# Patient Record
Sex: FEMALE | Race: BLACK OR AFRICAN AMERICAN | URBAN - METROPOLITAN AREA
[De-identification: names, ages, dates, MRNs, and addresses within clinical notes are randomized per-mention and may not be internally consistent; named-entity substitution may affect disease eponyms.]

---

## 2021-01-19 ENCOUNTER — OFFICE VISIT (OUTPATIENT)
Dept: URBAN - METROPOLITAN AREA CLINIC 25 | Facility: CLINIC | Age: 36
End: 2021-01-19

## 2021-02-04 ENCOUNTER — OFFICE VISIT (OUTPATIENT)
Dept: URBAN - METROPOLITAN AREA TELEHEALTH 2 | Facility: TELEHEALTH | Age: 36
End: 2021-02-04

## 2021-02-04 RX ORDER — PREDNISONE 10 MG/1
TABLET ORAL
Qty: 0 | Refills: 0 | Status: ACTIVE | COMMUNITY
Start: 1900-01-01 | End: 1900-01-01

## 2021-02-04 RX ORDER — GABAPENTIN 600 MG/1
TAKE 1 TABLET (600 MG) BY ORAL ROUTE 3 TIMES PER DAY TABLET, FILM COATED ORAL
Qty: 0 | Refills: 0 | Status: ACTIVE | COMMUNITY
Start: 1900-01-01 | End: 1900-01-01

## 2021-02-04 RX ORDER — OXYCODONE HYDROCHLORIDE AND ACETAMINOPHEN 10; 325 MG/1; MG/1
TABLET ORAL
Qty: 0 | Refills: 0 | Status: ACTIVE | COMMUNITY
Start: 1900-01-01 | End: 1900-01-01

## 2021-03-04 ENCOUNTER — OFFICE VISIT (OUTPATIENT)
Dept: URBAN - METROPOLITAN AREA TELEHEALTH 2 | Facility: TELEHEALTH | Age: 36
End: 2021-03-04

## 2021-03-17 ENCOUNTER — OFFICE VISIT (OUTPATIENT)
Dept: URBAN - METROPOLITAN AREA TELEHEALTH 2 | Facility: TELEHEALTH | Age: 36
End: 2021-03-17

## 2021-03-17 RX ORDER — PREDNISONE 10 MG/1
TABLET ORAL
Qty: 0 | Refills: 0 | Status: ACTIVE | COMMUNITY
Start: 1900-01-01

## 2021-03-17 RX ORDER — NIFEDIPINE 60 MG/1
TABLET, EXTENDED RELEASE ORAL
Qty: 30 UNSPECIFIED | Status: ACTIVE | COMMUNITY

## 2021-03-17 RX ORDER — HYDROXYCHLOROQUINE SULFATE 200 MG/1
TABLET, FILM COATED ORAL
Qty: 60 UNSPECIFIED | Status: ACTIVE | COMMUNITY

## 2021-03-17 RX ORDER — OXYCODONE HYDROCHLORIDE AND ACETAMINOPHEN 10; 325 MG/1; MG/1
TABLET ORAL
Qty: 0 | Refills: 0 | Status: ACTIVE | COMMUNITY
Start: 1900-01-01

## 2021-03-17 RX ORDER — NYSTATIN 100000 [USP'U]/G
OINTMENT TOPICAL
Qty: 15 UNSPECIFIED | Status: ACTIVE | COMMUNITY

## 2021-03-17 RX ORDER — GABAPENTIN 600 MG/1
TAKE 1 TABLET (600 MG) BY ORAL ROUTE 3 TIMES PER DAY TABLET, FILM COATED ORAL
Qty: 0 | Refills: 0 | Status: ACTIVE | COMMUNITY
Start: 1900-01-01

## 2021-03-17 RX ORDER — OXYCODONE AND ACETAMINOPHEN 10; 325 MG/1; MG/1
TABLET ORAL
Qty: 100 UNSPECIFIED | Status: ACTIVE | COMMUNITY

## 2021-03-17 RX ORDER — DOXYCYCLINE HYCLATE 100 MG/1
CAPSULE ORAL
Qty: 28 UNSPECIFIED | Status: ACTIVE | COMMUNITY

## 2021-07-22 ENCOUNTER — TELEPHONE ENCOUNTER (OUTPATIENT)
Dept: URBAN - METROPOLITAN AREA CLINIC 25 | Facility: CLINIC | Age: 36
End: 2021-07-22

## 2021-07-26 ENCOUNTER — OFFICE VISIT (OUTPATIENT)
Dept: URBAN - METROPOLITAN AREA CLINIC 84 | Facility: CLINIC | Age: 36
End: 2021-07-26

## 2021-07-26 RX ORDER — NYSTATIN 100000 [USP'U]/G
OINTMENT TOPICAL
Qty: 15 UNSPECIFIED | Status: ACTIVE | COMMUNITY

## 2021-07-26 RX ORDER — PREDNISONE 10 MG/1
TABLET ORAL
Qty: 0 | Refills: 0 | Status: ACTIVE | COMMUNITY
Start: 1900-01-01

## 2021-07-26 RX ORDER — OXYCODONE AND ACETAMINOPHEN 10; 325 MG/1; MG/1
TABLET ORAL
Qty: 100 UNSPECIFIED | Status: ACTIVE | COMMUNITY

## 2021-07-26 RX ORDER — HYDROXYCHLOROQUINE SULFATE 200 MG/1
TABLET, FILM COATED ORAL
Qty: 60 UNSPECIFIED | Status: ACTIVE | COMMUNITY

## 2021-07-26 RX ORDER — NIFEDIPINE 60 MG/1
TABLET, EXTENDED RELEASE ORAL
Qty: 30 UNSPECIFIED | Status: ACTIVE | COMMUNITY

## 2021-07-26 RX ORDER — DOXYCYCLINE HYCLATE 100 MG/1
CAPSULE ORAL
Qty: 28 UNSPECIFIED | Status: ACTIVE | COMMUNITY

## 2021-07-26 RX ORDER — GABAPENTIN 600 MG/1
TAKE 1 TABLET (600 MG) BY ORAL ROUTE 3 TIMES PER DAY TABLET, FILM COATED ORAL
Qty: 0 | Refills: 0 | Status: ACTIVE | COMMUNITY
Start: 1900-01-01

## 2021-07-26 RX ORDER — OXYCODONE HYDROCHLORIDE AND ACETAMINOPHEN 10; 325 MG/1; MG/1
TABLET ORAL
Qty: 0 | Refills: 0 | Status: ACTIVE | COMMUNITY
Start: 1900-01-01

## 2021-07-27 ENCOUNTER — TELEPHONE ENCOUNTER (OUTPATIENT)
Dept: URBAN - METROPOLITAN AREA SURGERY CENTER 30 | Facility: SURGERY CENTER | Age: 36
End: 2021-07-27

## 2021-07-29 ENCOUNTER — TELEPHONE ENCOUNTER (OUTPATIENT)
Dept: URBAN - METROPOLITAN AREA SURGERY CENTER 30 | Facility: SURGERY CENTER | Age: 36
End: 2021-07-29

## 2021-07-29 ENCOUNTER — WEB ENCOUNTER (OUTPATIENT)
Dept: URBAN - METROPOLITAN AREA CLINIC 25 | Facility: CLINIC | Age: 36
End: 2021-07-29

## 2021-07-29 ENCOUNTER — OFFICE VISIT (OUTPATIENT)
Dept: URBAN - METROPOLITAN AREA CLINIC 25 | Facility: CLINIC | Age: 36
End: 2021-07-29
Payer: COMMERCIAL

## 2021-07-29 ENCOUNTER — DASHBOARD ENCOUNTERS (OUTPATIENT)
Age: 36
End: 2021-07-29

## 2021-07-29 VITALS
WEIGHT: 240 LBS | TEMPERATURE: 97.1 F | SYSTOLIC BLOOD PRESSURE: 109 MMHG | HEIGHT: 61 IN | BODY MASS INDEX: 45.31 KG/M2 | DIASTOLIC BLOOD PRESSURE: 79 MMHG | HEART RATE: 123 BPM

## 2021-07-29 DIAGNOSIS — R10.84 GENERALIZED ABDOMINAL PAIN: ICD-10-CM

## 2021-07-29 DIAGNOSIS — R63.4 WEIGHT LOSS: ICD-10-CM

## 2021-07-29 DIAGNOSIS — R11.14 BILIOUS VOMITING WITH NAUSEA: ICD-10-CM

## 2021-07-29 DIAGNOSIS — R11.0 NAUSEA: ICD-10-CM

## 2021-07-29 PROCEDURE — 99204 OFFICE O/P NEW MOD 45 MIN: CPT | Performed by: INTERNAL MEDICINE

## 2021-07-29 RX ORDER — HYDROXYCHLOROQUINE SULFATE 200 MG/1
TABLET, FILM COATED ORAL
Qty: 60 UNSPECIFIED | Status: ACTIVE | COMMUNITY

## 2021-07-29 RX ORDER — OMEPRAZOLE 40 MG/1
1 CAPSULE 30 MINUTES BEFORE MORNING MEAL CAPSULE, DELAYED RELEASE ORAL ONCE A DAY
Qty: 30 | Refills: 3 | OUTPATIENT
Start: 2021-07-29

## 2021-07-29 RX ORDER — OXYCODONE AND ACETAMINOPHEN 10; 325 MG/1; MG/1
TABLET ORAL
Qty: 100 UNSPECIFIED | Status: ACTIVE | COMMUNITY

## 2021-07-29 RX ORDER — DOXYCYCLINE HYCLATE 100 MG/1
CAPSULE ORAL
Qty: 28 UNSPECIFIED | Status: ACTIVE | COMMUNITY

## 2021-07-29 RX ORDER — POLYETHYLENE GLYCOL 3350 17 G/17G
AS DIRECTED POWDER, FOR SOLUTION ORAL ONCE A DAY
Qty: 238 GRAM | Refills: 0 | OUTPATIENT
Start: 2021-07-29 | End: 2021-07-30

## 2021-07-29 RX ORDER — CYPROHEPTADINE HYDROCHLORIDE 4 MG/1
1 TABLET TABLET ORAL ONCE A DAY
Qty: 30 TABLET | Refills: 2 | OUTPATIENT
Start: 2021-07-29

## 2021-07-29 RX ORDER — NYSTATIN 100000 [USP'U]/G
OINTMENT TOPICAL
Qty: 15 UNSPECIFIED | Status: DISCONTINUED | COMMUNITY

## 2021-07-29 RX ORDER — GABAPENTIN 600 MG/1
TAKE 1 TABLET (600 MG) BY ORAL ROUTE 3 TIMES PER DAY TABLET, FILM COATED ORAL
Qty: 0 | Refills: 0 | Status: ACTIVE | COMMUNITY
Start: 1900-01-01

## 2021-07-29 RX ORDER — OXYCODONE HYDROCHLORIDE AND ACETAMINOPHEN 10; 325 MG/1; MG/1
TABLET ORAL
Qty: 0 | Refills: 0 | Status: ACTIVE | COMMUNITY
Start: 1900-01-01

## 2021-07-29 RX ORDER — PREDNISONE 10 MG/1
TABLET ORAL
Qty: 0 | Refills: 0 | Status: ACTIVE | COMMUNITY
Start: 1900-01-01

## 2021-07-29 RX ORDER — ONDANSETRON HYDROCHLORIDE 4 MG/1
1 TABLET TABLET, FILM COATED ORAL ONCE A DAY
Qty: 10 | Refills: 1 | OUTPATIENT
Start: 2021-07-29

## 2021-07-29 RX ORDER — NIFEDIPINE 60 MG/1
TABLET, EXTENDED RELEASE ORAL
Qty: 30 UNSPECIFIED | Status: ACTIVE | COMMUNITY

## 2021-07-29 NOTE — HPI-TODAY'S VISIT:
37 yo pt presents with c/o 40 lb weight loss due to n/v and can only tolerate popsicles with associated rlq abd pain with blood in stool and sore throat. Sxs began in June.

## 2021-08-11 ENCOUNTER — OFFICE VISIT (OUTPATIENT)
Dept: URBAN - METROPOLITAN AREA TELEHEALTH 2 | Facility: TELEHEALTH | Age: 36
End: 2021-08-11

## 2021-08-19 ENCOUNTER — OFFICE VISIT (OUTPATIENT)
Dept: URBAN - METROPOLITAN AREA CLINIC 25 | Facility: CLINIC | Age: 36
End: 2021-08-19

## 2021-08-20 ENCOUNTER — OFFICE VISIT (OUTPATIENT)
Dept: URBAN - METROPOLITAN AREA SURGERY CENTER 20 | Facility: SURGERY CENTER | Age: 36
End: 2021-08-20

## 2021-08-20 PROBLEM — 102614006: Status: ACTIVE | Noted: 2021-07-29

## 2021-08-25 ENCOUNTER — OFFICE VISIT (OUTPATIENT)
Dept: URBAN - METROPOLITAN AREA SURGERY CENTER 20 | Facility: SURGERY CENTER | Age: 36
End: 2021-08-25

## 2021-08-26 ENCOUNTER — TELEPHONE ENCOUNTER (OUTPATIENT)
Dept: URBAN - METROPOLITAN AREA CLINIC 84 | Facility: CLINIC | Age: 36
End: 2021-08-26

## 2021-08-30 ENCOUNTER — TELEPHONE ENCOUNTER (OUTPATIENT)
Dept: URBAN - METROPOLITAN AREA CLINIC 92 | Facility: CLINIC | Age: 36
End: 2021-08-30

## 2021-08-30 ENCOUNTER — OFFICE VISIT (OUTPATIENT)
Dept: URBAN - METROPOLITAN AREA MEDICAL CENTER 17 | Facility: MEDICAL CENTER | Age: 36
End: 2021-08-30
Payer: COMMERCIAL

## 2021-08-30 DIAGNOSIS — R10.84 ABDOMINAL CRAMPING, GENERALIZED: ICD-10-CM

## 2021-08-30 PROBLEM — 89362005: Status: ACTIVE | Noted: 2021-07-29

## 2021-08-30 PROBLEM — 71419002: Status: ACTIVE | Noted: 2021-07-29

## 2021-08-30 PROCEDURE — 45378 DIAGNOSTIC COLONOSCOPY: CPT | Performed by: INTERNAL MEDICINE

## 2021-08-30 RX ORDER — OMEPRAZOLE 40 MG/1
1 CAPSULE 30 MINUTES BEFORE MORNING MEAL CAPSULE, DELAYED RELEASE ORAL ONCE A DAY
Qty: 30 | Refills: 3 | Status: ACTIVE | COMMUNITY
Start: 2021-07-29

## 2021-08-30 RX ORDER — PREDNISONE 10 MG/1
TABLET ORAL
Qty: 0 | Refills: 0 | Status: ACTIVE | COMMUNITY
Start: 1900-01-01

## 2021-08-30 RX ORDER — OXYCODONE AND ACETAMINOPHEN 10; 325 MG/1; MG/1
TABLET ORAL
Qty: 100 UNSPECIFIED | Status: ACTIVE | COMMUNITY

## 2021-08-30 RX ORDER — CYPROHEPTADINE HYDROCHLORIDE 4 MG/1
1 TABLET TABLET ORAL ONCE A DAY
Qty: 30 TABLET | Refills: 2 | Status: ACTIVE | COMMUNITY
Start: 2021-07-29

## 2021-08-30 RX ORDER — GABAPENTIN 600 MG/1
TAKE 1 TABLET (600 MG) BY ORAL ROUTE 3 TIMES PER DAY TABLET, FILM COATED ORAL
Qty: 0 | Refills: 0 | Status: ACTIVE | COMMUNITY
Start: 1900-01-01

## 2021-08-30 RX ORDER — HYDROXYCHLOROQUINE SULFATE 200 MG/1
TABLET, FILM COATED ORAL
Qty: 60 UNSPECIFIED | Status: ACTIVE | COMMUNITY

## 2021-08-30 RX ORDER — ONDANSETRON HYDROCHLORIDE 4 MG/1
1 TABLET TABLET, FILM COATED ORAL ONCE A DAY
Qty: 10 | Refills: 1 | Status: ACTIVE | COMMUNITY
Start: 2021-07-29

## 2021-08-30 RX ORDER — NIFEDIPINE 60 MG/1
TABLET, EXTENDED RELEASE ORAL
Qty: 30 UNSPECIFIED | Status: ACTIVE | COMMUNITY

## 2021-08-30 RX ORDER — DOXYCYCLINE HYCLATE 100 MG/1
CAPSULE ORAL
Qty: 28 UNSPECIFIED | Status: ACTIVE | COMMUNITY

## 2021-08-30 RX ORDER — OXYCODONE HYDROCHLORIDE AND ACETAMINOPHEN 10; 325 MG/1; MG/1
TABLET ORAL
Qty: 0 | Refills: 0 | Status: ACTIVE | COMMUNITY
Start: 1900-01-01

## 2021-10-14 ENCOUNTER — OFFICE VISIT (OUTPATIENT)
Dept: URBAN - METROPOLITAN AREA TELEHEALTH 2 | Facility: TELEHEALTH | Age: 36
End: 2021-10-14

## 2021-10-14 RX ORDER — HYDROXYCHLOROQUINE SULFATE 200 MG/1
TABLET, FILM COATED ORAL
Qty: 60 UNSPECIFIED | Status: ACTIVE | COMMUNITY

## 2021-10-14 RX ORDER — DOXYCYCLINE HYCLATE 100 MG/1
CAPSULE ORAL
Qty: 28 UNSPECIFIED | Status: ACTIVE | COMMUNITY

## 2021-10-14 RX ORDER — GABAPENTIN 600 MG/1
TAKE 1 TABLET (600 MG) BY ORAL ROUTE 3 TIMES PER DAY TABLET, FILM COATED ORAL
Qty: 0 | Refills: 0 | Status: ACTIVE | COMMUNITY
Start: 1900-01-01

## 2021-10-14 RX ORDER — PREDNISONE 10 MG/1
TABLET ORAL
Qty: 0 | Refills: 0 | Status: ACTIVE | COMMUNITY
Start: 1900-01-01

## 2021-10-14 RX ORDER — ONDANSETRON HYDROCHLORIDE 4 MG/1
1 TABLET TABLET, FILM COATED ORAL ONCE A DAY
Qty: 10 | Refills: 1 | Status: ACTIVE | COMMUNITY
Start: 2021-07-29

## 2021-10-14 RX ORDER — OXYCODONE AND ACETAMINOPHEN 10; 325 MG/1; MG/1
TABLET ORAL
Qty: 100 UNSPECIFIED | Status: ACTIVE | COMMUNITY

## 2021-10-14 RX ORDER — CYPROHEPTADINE HYDROCHLORIDE 4 MG/1
1 TABLET TABLET ORAL ONCE A DAY
Qty: 30 TABLET | Refills: 2 | Status: ACTIVE | COMMUNITY
Start: 2021-07-29

## 2021-10-14 RX ORDER — NIFEDIPINE 60 MG/1
TABLET, EXTENDED RELEASE ORAL
Qty: 30 UNSPECIFIED | Status: ACTIVE | COMMUNITY

## 2021-10-14 RX ORDER — OXYCODONE HYDROCHLORIDE AND ACETAMINOPHEN 10; 325 MG/1; MG/1
TABLET ORAL
Qty: 0 | Refills: 0 | Status: ACTIVE | COMMUNITY
Start: 1900-01-01

## 2021-10-14 RX ORDER — OMEPRAZOLE 40 MG/1
1 CAPSULE 30 MINUTES BEFORE MORNING MEAL CAPSULE, DELAYED RELEASE ORAL ONCE A DAY
Qty: 30 | Refills: 3 | Status: ACTIVE | COMMUNITY
Start: 2021-07-29

## 2021-12-15 ENCOUNTER — P2P PATIENT RECORD (OUTPATIENT)
Age: 36
End: 2021-12-15

## 2021-12-16 ENCOUNTER — OUT OF OFFICE VISIT (OUTPATIENT)
Dept: URBAN - METROPOLITAN AREA MEDICAL CENTER 8 | Facility: MEDICAL CENTER | Age: 36
End: 2021-12-16
Payer: COMMERCIAL

## 2021-12-16 ENCOUNTER — P2P PATIENT RECORD (OUTPATIENT)
Age: 36
End: 2021-12-16

## 2021-12-16 DIAGNOSIS — R93.3 ABN FINDINGS-GI TRACT: ICD-10-CM

## 2021-12-16 DIAGNOSIS — R11.0 AM NAUSEA: ICD-10-CM

## 2021-12-16 DIAGNOSIS — R10.84 ABDOMINAL CRAMPING, GENERALIZED: ICD-10-CM

## 2021-12-16 DIAGNOSIS — R19.7 ACUTE DIARRHEA: ICD-10-CM

## 2021-12-16 DIAGNOSIS — D64.89 ANEMIA DUE TO OTHER CAUSE: ICD-10-CM

## 2021-12-16 PROCEDURE — 99232 SBSQ HOSP IP/OBS MODERATE 35: CPT | Performed by: PHYSICIAN ASSISTANT

## 2021-12-16 PROCEDURE — 99222 1ST HOSP IP/OBS MODERATE 55: CPT | Performed by: PHYSICIAN ASSISTANT

## 2021-12-16 PROCEDURE — G8427 DOCREV CUR MEDS BY ELIG CLIN: HCPCS | Performed by: PHYSICIAN ASSISTANT

## 2021-12-24 ENCOUNTER — P2P PATIENT RECORD (OUTPATIENT)
Age: 36
End: 2021-12-24

## 2022-03-18 ENCOUNTER — OUT OF OFFICE VISIT (OUTPATIENT)
Dept: URBAN - METROPOLITAN AREA MEDICAL CENTER 31 | Facility: MEDICAL CENTER | Age: 37
End: 2022-03-18
Payer: COMMERCIAL

## 2022-03-18 DIAGNOSIS — Z98.84 BARIATRIC SURG STAT-UNSP: ICD-10-CM

## 2022-03-18 DIAGNOSIS — R13.19 CERVICAL DYSPHAGIA: ICD-10-CM

## 2022-03-18 DIAGNOSIS — R10.84 ABDOMINAL CRAMPING, GENERALIZED: ICD-10-CM

## 2022-03-18 DIAGNOSIS — R11.2 ACUTE NAUSEA WITH NONBILIOUS VOMITING: ICD-10-CM

## 2022-03-18 PROCEDURE — 99232 SBSQ HOSP IP/OBS MODERATE 35: CPT | Performed by: STUDENT IN AN ORGANIZED HEALTH CARE EDUCATION/TRAINING PROGRAM

## 2022-03-19 ENCOUNTER — OUT OF OFFICE VISIT (OUTPATIENT)
Dept: URBAN - METROPOLITAN AREA MEDICAL CENTER 31 | Facility: MEDICAL CENTER | Age: 37
End: 2022-03-19
Payer: COMMERCIAL

## 2022-03-19 DIAGNOSIS — K22.2 ACQUIRED ESOPHAGEAL RING: ICD-10-CM

## 2022-03-19 DIAGNOSIS — R10.13 ABDOMINAL DISCOMFORT, EPIGASTRIC: ICD-10-CM

## 2022-03-19 DIAGNOSIS — K31.89 ACQUIRED DEFORMITY OF DUODENUM: ICD-10-CM

## 2022-03-19 DIAGNOSIS — K22.89 ESOPHAGEAL BLEED, NON-VARICEAL: ICD-10-CM

## 2022-03-19 DIAGNOSIS — R13.19 CERVICAL DYSPHAGIA: ICD-10-CM

## 2022-03-19 DIAGNOSIS — K29.60 ADENOPAPILLOMATOSIS GASTRICA: ICD-10-CM

## 2022-03-19 PROCEDURE — 43248 EGD GUIDE WIRE INSERTION: CPT | Performed by: INTERNAL MEDICINE

## 2022-03-19 PROCEDURE — 43239 EGD BIOPSY SINGLE/MULTIPLE: CPT | Performed by: INTERNAL MEDICINE

## 2022-03-22 ENCOUNTER — OUT OF OFFICE VISIT (OUTPATIENT)
Dept: URBAN - METROPOLITAN AREA MEDICAL CENTER 31 | Facility: MEDICAL CENTER | Age: 37
End: 2022-03-22
Payer: COMMERCIAL

## 2022-03-22 DIAGNOSIS — R11.2 ACUTE NAUSEA WITH NONBILIOUS VOMITING: ICD-10-CM

## 2022-03-22 DIAGNOSIS — Z98.84 BARIATRIC SURG STAT-UNSP: ICD-10-CM

## 2022-03-22 DIAGNOSIS — R10.84 ABDOMINAL CRAMPING, GENERALIZED: ICD-10-CM

## 2022-03-22 PROCEDURE — 99232 SBSQ HOSP IP/OBS MODERATE 35: CPT | Performed by: STUDENT IN AN ORGANIZED HEALTH CARE EDUCATION/TRAINING PROGRAM

## 2022-03-29 ENCOUNTER — OUT OF OFFICE VISIT (OUTPATIENT)
Dept: URBAN - METROPOLITAN AREA MEDICAL CENTER 31 | Facility: MEDICAL CENTER | Age: 37
End: 2022-03-29
Payer: COMMERCIAL

## 2022-03-29 DIAGNOSIS — R10.84 ABDOMINAL CRAMPING, GENERALIZED: ICD-10-CM

## 2022-03-29 DIAGNOSIS — R11.0 AM NAUSEA: ICD-10-CM

## 2022-03-29 PROCEDURE — 99232 SBSQ HOSP IP/OBS MODERATE 35: CPT | Performed by: STUDENT IN AN ORGANIZED HEALTH CARE EDUCATION/TRAINING PROGRAM

## 2022-04-18 ENCOUNTER — OUT OF OFFICE VISIT (OUTPATIENT)
Dept: URBAN - METROPOLITAN AREA MEDICAL CENTER 31 | Facility: MEDICAL CENTER | Age: 37
End: 2022-04-18
Payer: COMMERCIAL

## 2022-04-18 DIAGNOSIS — R11.2 ACUTE NAUSEA WITH NONBILIOUS VOMITING: ICD-10-CM

## 2022-04-18 DIAGNOSIS — R10.13 ABDOMINAL DISCOMFORT, EPIGASTRIC: ICD-10-CM

## 2022-04-18 DIAGNOSIS — Z98.84 BARIATRIC SURG STAT-UNSP: ICD-10-CM

## 2022-04-18 PROCEDURE — 99232 SBSQ HOSP IP/OBS MODERATE 35: CPT | Performed by: PHYSICIAN ASSISTANT

## 2022-04-18 PROCEDURE — 99222 1ST HOSP IP/OBS MODERATE 55: CPT | Performed by: PHYSICIAN ASSISTANT

## 2022-04-18 PROCEDURE — G8427 DOCREV CUR MEDS BY ELIG CLIN: HCPCS | Performed by: PHYSICIAN ASSISTANT

## 2022-04-30 ENCOUNTER — TELEPHONE ENCOUNTER (OUTPATIENT)
Dept: URBAN - METROPOLITAN AREA CLINIC 121 | Facility: CLINIC | Age: 37
End: 2022-04-30

## 2022-05-01 ENCOUNTER — TELEPHONE ENCOUNTER (OUTPATIENT)
Dept: URBAN - METROPOLITAN AREA CLINIC 121 | Facility: CLINIC | Age: 37
End: 2022-05-01

## 2022-06-23 ENCOUNTER — OUT OF OFFICE VISIT (OUTPATIENT)
Dept: URBAN - METROPOLITAN AREA MEDICAL CENTER 31 | Facility: MEDICAL CENTER | Age: 37
End: 2022-06-23
Payer: COMMERCIAL

## 2022-06-23 DIAGNOSIS — R10.84 ABDOMINAL CRAMPING, GENERALIZED: ICD-10-CM

## 2022-06-23 DIAGNOSIS — R11.2 ACUTE NAUSEA WITH NONBILIOUS VOMITING: ICD-10-CM

## 2022-06-23 PROCEDURE — G8427 DOCREV CUR MEDS BY ELIG CLIN: HCPCS | Performed by: PHYSICIAN ASSISTANT

## 2022-06-23 PROCEDURE — 99222 1ST HOSP IP/OBS MODERATE 55: CPT | Performed by: PHYSICIAN ASSISTANT

## 2022-06-24 ENCOUNTER — OUT OF OFFICE VISIT (OUTPATIENT)
Dept: URBAN - METROPOLITAN AREA MEDICAL CENTER 31 | Facility: MEDICAL CENTER | Age: 37
End: 2022-06-24
Payer: COMMERCIAL

## 2022-06-24 DIAGNOSIS — R11.2 ACUTE NAUSEA WITH NONBILIOUS VOMITING: ICD-10-CM

## 2022-06-24 DIAGNOSIS — R10.84 ABDOMINAL CRAMPING, GENERALIZED: ICD-10-CM

## 2022-06-24 PROCEDURE — 99232 SBSQ HOSP IP/OBS MODERATE 35: CPT | Performed by: PHYSICIAN ASSISTANT

## 2023-02-10 ENCOUNTER — CLAIMS CREATED FROM THE CLAIM WINDOW (OUTPATIENT)
Dept: URBAN - METROPOLITAN AREA MEDICAL CENTER 10 | Facility: MEDICAL CENTER | Age: 38
End: 2023-02-10

## 2023-02-10 ENCOUNTER — CLAIMS CREATED FROM THE CLAIM WINDOW (OUTPATIENT)
Dept: URBAN - METROPOLITAN AREA MEDICAL CENTER 10 | Facility: MEDICAL CENTER | Age: 38
End: 2023-02-10
Payer: COMMERCIAL

## 2023-02-10 DIAGNOSIS — R11.2 ACUTE NAUSEA WITH NONBILIOUS VOMITING: ICD-10-CM

## 2023-02-10 DIAGNOSIS — I82.412 ACUTE DEEP VEIN THROMBOSIS (DVT) OF FEMORAL VEIN OF LEFT LOWER EXTREMITY: ICD-10-CM

## 2023-02-10 DIAGNOSIS — R10.84 ABDOMINAL CRAMPING, GENERALIZED: ICD-10-CM

## 2023-02-10 PROCEDURE — G8427 DOCREV CUR MEDS BY ELIG CLIN: HCPCS | Performed by: PHYSICIAN ASSISTANT

## 2023-02-10 PROCEDURE — 99222 1ST HOSP IP/OBS MODERATE 55: CPT | Performed by: PHYSICIAN ASSISTANT

## 2023-02-13 ENCOUNTER — OUT OF OFFICE VISIT (OUTPATIENT)
Dept: URBAN - METROPOLITAN AREA MEDICAL CENTER 10 | Facility: MEDICAL CENTER | Age: 38
End: 2023-02-13
Payer: COMMERCIAL

## 2023-02-13 DIAGNOSIS — Z98.84 BARIATRIC SURG STAT-UNSP: ICD-10-CM

## 2023-02-13 DIAGNOSIS — R13.19 CERVICAL DYSPHAGIA: ICD-10-CM

## 2023-02-13 DIAGNOSIS — K22.2 ACQUIRED ESOPHAGEAL RING: ICD-10-CM

## 2023-02-13 PROCEDURE — 43249 ESOPH EGD DILATION <30 MM: CPT | Performed by: INTERNAL MEDICINE

## 2023-02-15 ENCOUNTER — CLAIMS CREATED FROM THE CLAIM WINDOW (OUTPATIENT)
Dept: URBAN - METROPOLITAN AREA MEDICAL CENTER 10 | Facility: MEDICAL CENTER | Age: 38
End: 2023-02-15

## 2023-02-15 ENCOUNTER — CLAIMS CREATED FROM THE CLAIM WINDOW (OUTPATIENT)
Dept: URBAN - METROPOLITAN AREA MEDICAL CENTER 10 | Facility: MEDICAL CENTER | Age: 38
End: 2023-02-15
Payer: COMMERCIAL

## 2023-02-15 DIAGNOSIS — R11.2 ACUTE NAUSEA WITH NONBILIOUS VOMITING: ICD-10-CM

## 2023-02-15 DIAGNOSIS — R10.84 ABDOMINAL CRAMPING, GENERALIZED: ICD-10-CM

## 2023-02-15 DIAGNOSIS — K25.9 ANTRAL ULCER: ICD-10-CM

## 2023-02-15 PROCEDURE — 99232 SBSQ HOSP IP/OBS MODERATE 35: CPT | Performed by: PHYSICIAN ASSISTANT

## 2023-02-18 ENCOUNTER — CLAIMS CREATED FROM THE CLAIM WINDOW (OUTPATIENT)
Dept: URBAN - METROPOLITAN AREA MEDICAL CENTER 10 | Facility: MEDICAL CENTER | Age: 38
End: 2023-02-18
Payer: COMMERCIAL

## 2023-02-18 ENCOUNTER — OUT OF OFFICE VISIT (OUTPATIENT)
Dept: URBAN - METROPOLITAN AREA MEDICAL CENTER 10 | Facility: MEDICAL CENTER | Age: 38
End: 2023-02-18

## 2023-02-18 DIAGNOSIS — R10.84 ABDOMINAL CRAMPING, GENERALIZED: ICD-10-CM

## 2023-02-18 DIAGNOSIS — K22.2 ACQUIRED ESOPHAGEAL RING: ICD-10-CM

## 2023-02-18 DIAGNOSIS — R13.19 CERVICAL DYSPHAGIA: ICD-10-CM

## 2023-02-18 DIAGNOSIS — R11.2 ACUTE NAUSEA WITH NONBILIOUS VOMITING: ICD-10-CM

## 2023-02-18 DIAGNOSIS — R11.0 AM NAUSEA: ICD-10-CM

## 2023-02-18 PROCEDURE — 99232 SBSQ HOSP IP/OBS MODERATE 35: CPT | Performed by: INTERNAL MEDICINE

## 2023-02-18 PROCEDURE — 99232 SBSQ HOSP IP/OBS MODERATE 35: CPT | Performed by: PHYSICIAN ASSISTANT

## 2023-02-20 ENCOUNTER — OUT OF OFFICE VISIT (OUTPATIENT)
Dept: URBAN - METROPOLITAN AREA MEDICAL CENTER 10 | Facility: MEDICAL CENTER | Age: 38
End: 2023-02-20

## 2023-02-20 ENCOUNTER — CLAIMS CREATED FROM THE CLAIM WINDOW (OUTPATIENT)
Dept: URBAN - METROPOLITAN AREA MEDICAL CENTER 10 | Facility: MEDICAL CENTER | Age: 38
End: 2023-02-20
Payer: COMMERCIAL

## 2023-02-20 DIAGNOSIS — R11.10 ACUTE VOMITING: ICD-10-CM

## 2023-02-20 DIAGNOSIS — R10.84 ABDOMINAL CRAMPING, GENERALIZED: ICD-10-CM

## 2023-02-20 DIAGNOSIS — K59.09 CHANGE IN BOWEL MOVEMENTS INTERMITTENT CONSTIPATION. URGENCY IN THE MORNING.: ICD-10-CM

## 2023-02-20 DIAGNOSIS — R13.19 CERVICAL DYSPHAGIA: ICD-10-CM

## 2023-02-20 PROCEDURE — 99232 SBSQ HOSP IP/OBS MODERATE 35: CPT | Performed by: PHYSICIAN ASSISTANT

## 2023-02-22 ENCOUNTER — OUT OF OFFICE VISIT (OUTPATIENT)
Dept: URBAN - METROPOLITAN AREA MEDICAL CENTER 10 | Facility: MEDICAL CENTER | Age: 38
End: 2023-02-22

## 2023-02-22 ENCOUNTER — CLAIMS CREATED FROM THE CLAIM WINDOW (OUTPATIENT)
Dept: URBAN - METROPOLITAN AREA MEDICAL CENTER 10 | Facility: MEDICAL CENTER | Age: 38
End: 2023-02-22
Payer: COMMERCIAL

## 2023-02-22 DIAGNOSIS — R13.19 CERVICAL DYSPHAGIA: ICD-10-CM

## 2023-02-22 DIAGNOSIS — R11.0 AM NAUSEA: ICD-10-CM

## 2023-02-22 DIAGNOSIS — R10.84 ABDOMINAL CRAMPING, GENERALIZED: ICD-10-CM

## 2023-02-22 PROCEDURE — 99231 SBSQ HOSP IP/OBS SF/LOW 25: CPT | Performed by: PHYSICIAN ASSISTANT

## 2023-02-23 ENCOUNTER — OUT OF OFFICE VISIT (OUTPATIENT)
Dept: URBAN - METROPOLITAN AREA MEDICAL CENTER 10 | Facility: MEDICAL CENTER | Age: 38
End: 2023-02-23

## 2023-02-23 ENCOUNTER — CLAIMS CREATED FROM THE CLAIM WINDOW (OUTPATIENT)
Dept: URBAN - METROPOLITAN AREA MEDICAL CENTER 10 | Facility: MEDICAL CENTER | Age: 38
End: 2023-02-23
Payer: COMMERCIAL

## 2023-02-23 DIAGNOSIS — R13.19 CERVICAL DYSPHAGIA: ICD-10-CM

## 2023-02-23 DIAGNOSIS — R10.84 ABDOMINAL CRAMPING, GENERALIZED: ICD-10-CM

## 2023-02-23 DIAGNOSIS — R11.2 ACUTE NAUSEA WITH NONBILIOUS VOMITING: ICD-10-CM

## 2023-02-23 PROCEDURE — 99231 SBSQ HOSP IP/OBS SF/LOW 25: CPT | Performed by: PHYSICIAN ASSISTANT

## 2024-01-31 ENCOUNTER — CLAIMS CREATED FROM THE CLAIM WINDOW (OUTPATIENT)
Dept: URBAN - METROPOLITAN AREA MEDICAL CENTER 10 | Facility: MEDICAL CENTER | Age: 39
End: 2024-01-31
Payer: COMMERCIAL

## 2024-01-31 DIAGNOSIS — R13.19 CERVICAL DYSPHAGIA: ICD-10-CM

## 2024-01-31 DIAGNOSIS — R93.3 ABN FINDINGS-GI TRACT: ICD-10-CM

## 2024-01-31 DIAGNOSIS — R11.2 ACUTE NAUSEA WITH NONBILIOUS VOMITING: ICD-10-CM

## 2024-01-31 PROCEDURE — G8427 DOCREV CUR MEDS BY ELIG CLIN: HCPCS | Performed by: PHYSICIAN ASSISTANT

## 2024-01-31 PROCEDURE — 99222 1ST HOSP IP/OBS MODERATE 55: CPT | Performed by: PHYSICIAN ASSISTANT

## 2024-01-31 PROCEDURE — 99255 IP/OBS CONSLTJ NEW/EST HI 80: CPT | Performed by: PHYSICIAN ASSISTANT

## 2024-02-01 ENCOUNTER — LAB (OUTPATIENT)
Dept: URBAN - METROPOLITAN AREA MEDICAL CENTER 10 | Facility: MEDICAL CENTER | Age: 39
End: 2024-02-01
Payer: COMMERCIAL

## 2024-02-01 DIAGNOSIS — K22.2 ACQUIRED ESOPHAGEAL RING: ICD-10-CM

## 2024-02-01 DIAGNOSIS — Z98.890 HISTORY OF NISSEN FUNDOPLICATION: ICD-10-CM

## 2024-02-01 PROCEDURE — 43213 ESOPHAGOSCOPY RETRO BALLOON: CPT | Performed by: INTERNAL MEDICINE

## 2024-02-01 PROCEDURE — 43235 EGD DIAGNOSTIC BRUSH WASH: CPT | Performed by: INTERNAL MEDICINE

## 2024-02-05 ENCOUNTER — LAB (OUTPATIENT)
Dept: URBAN - METROPOLITAN AREA MEDICAL CENTER 10 | Facility: MEDICAL CENTER | Age: 39
End: 2024-02-05
Payer: COMMERCIAL

## 2024-02-05 DIAGNOSIS — Z98.0 H/O BILLROTH II OPERATION: ICD-10-CM

## 2024-02-05 DIAGNOSIS — K22.2 ACQUIRED ESOPHAGEAL RING: ICD-10-CM

## 2024-02-05 DIAGNOSIS — R13.19 CERVICAL DYSPHAGIA: ICD-10-CM

## 2024-02-05 PROCEDURE — 43249 ESOPH EGD DILATION <30 MM: CPT | Performed by: INTERNAL MEDICINE

## 2024-02-05 PROCEDURE — 43235 EGD DIAGNOSTIC BRUSH WASH: CPT | Performed by: INTERNAL MEDICINE

## 2024-02-07 ENCOUNTER — LAB (OUTPATIENT)
Dept: URBAN - METROPOLITAN AREA MEDICAL CENTER 10 | Facility: MEDICAL CENTER | Age: 39
End: 2024-02-07
Payer: COMMERCIAL

## 2024-02-07 DIAGNOSIS — R10.33 ABDOMINAL PAIN, ACUTE, PERIUMBILICAL: ICD-10-CM

## 2024-02-07 DIAGNOSIS — R11.2 ACUTE NAUSEA WITH NONBILIOUS VOMITING: ICD-10-CM

## 2024-02-07 PROCEDURE — 99232 SBSQ HOSP IP/OBS MODERATE 35: CPT | Performed by: INTERNAL MEDICINE

## 2024-02-08 ENCOUNTER — LAB (OUTPATIENT)
Dept: URBAN - METROPOLITAN AREA MEDICAL CENTER 10 | Facility: MEDICAL CENTER | Age: 39
End: 2024-02-08
Payer: COMMERCIAL

## 2024-02-08 DIAGNOSIS — K22.2 ACQUIRED ESOPHAGEAL RING: ICD-10-CM

## 2024-02-08 DIAGNOSIS — R13.19 CERVICAL DYSPHAGIA: ICD-10-CM

## 2024-02-08 PROCEDURE — 43235 EGD DIAGNOSTIC BRUSH WASH: CPT | Performed by: INTERNAL MEDICINE

## 2024-02-08 PROCEDURE — 74360 X-RAY GUIDE GI DILATION: CPT | Performed by: INTERNAL MEDICINE

## 2024-02-08 PROCEDURE — 43212 ESOPHAGOSCOP STENT PLACEMENT: CPT | Performed by: INTERNAL MEDICINE

## 2024-02-10 ENCOUNTER — LAB (OUTPATIENT)
Dept: URBAN - METROPOLITAN AREA MEDICAL CENTER 10 | Facility: MEDICAL CENTER | Age: 39
End: 2024-02-10
Payer: COMMERCIAL

## 2024-02-10 ENCOUNTER — LAB (OUTPATIENT)
Dept: URBAN - METROPOLITAN AREA MEDICAL CENTER 10 | Facility: MEDICAL CENTER | Age: 39
End: 2024-02-10

## 2024-02-10 DIAGNOSIS — R11.2 ACUTE NAUSEA WITH NONBILIOUS VOMITING: ICD-10-CM

## 2024-02-10 DIAGNOSIS — R93.3 ABN FINDINGS-GI TRACT: ICD-10-CM

## 2024-02-10 DIAGNOSIS — R10.13 ABDOMINAL DISCOMFORT, EPIGASTRIC: ICD-10-CM

## 2024-02-10 DIAGNOSIS — R13.19 CERVICAL DYSPHAGIA: ICD-10-CM

## 2024-02-10 PROCEDURE — 99232 SBSQ HOSP IP/OBS MODERATE 35: CPT | Performed by: STUDENT IN AN ORGANIZED HEALTH CARE EDUCATION/TRAINING PROGRAM

## 2024-02-10 PROCEDURE — 99232 SBSQ HOSP IP/OBS MODERATE 35: CPT | Performed by: PHYSICIAN ASSISTANT

## 2024-02-11 ENCOUNTER — LAB (OUTPATIENT)
Dept: URBAN - METROPOLITAN AREA MEDICAL CENTER 10 | Facility: MEDICAL CENTER | Age: 39
End: 2024-02-11
Payer: COMMERCIAL

## 2024-02-11 ENCOUNTER — LAB (OUTPATIENT)
Dept: URBAN - METROPOLITAN AREA MEDICAL CENTER 10 | Facility: MEDICAL CENTER | Age: 39
End: 2024-02-11

## 2024-02-11 DIAGNOSIS — R11.2 ACUTE NAUSEA WITH NONBILIOUS VOMITING: ICD-10-CM

## 2024-02-11 DIAGNOSIS — R10.13 ABDOMINAL DISCOMFORT, EPIGASTRIC: ICD-10-CM

## 2024-02-11 DIAGNOSIS — R13.19 CERVICAL DYSPHAGIA: ICD-10-CM

## 2024-02-11 PROCEDURE — 99232 SBSQ HOSP IP/OBS MODERATE 35: CPT | Performed by: PHYSICIAN ASSISTANT

## 2024-02-11 PROCEDURE — 99232 SBSQ HOSP IP/OBS MODERATE 35: CPT | Performed by: STUDENT IN AN ORGANIZED HEALTH CARE EDUCATION/TRAINING PROGRAM

## 2024-02-12 ENCOUNTER — LAB (OUTPATIENT)
Dept: URBAN - METROPOLITAN AREA MEDICAL CENTER 10 | Facility: MEDICAL CENTER | Age: 39
End: 2024-02-12

## 2024-02-12 ENCOUNTER — LAB (OUTPATIENT)
Dept: URBAN - METROPOLITAN AREA MEDICAL CENTER 10 | Facility: MEDICAL CENTER | Age: 39
End: 2024-02-12
Payer: COMMERCIAL

## 2024-02-12 DIAGNOSIS — R13.19 CERVICAL DYSPHAGIA: ICD-10-CM

## 2024-02-12 DIAGNOSIS — R10.13 ABDOMINAL DISCOMFORT, EPIGASTRIC: ICD-10-CM

## 2024-02-12 DIAGNOSIS — R11.2 ACUTE NAUSEA WITH NONBILIOUS VOMITING: ICD-10-CM

## 2024-02-12 PROCEDURE — 99232 SBSQ HOSP IP/OBS MODERATE 35: CPT | Performed by: PHYSICIAN ASSISTANT

## 2024-02-12 PROCEDURE — 99232 SBSQ HOSP IP/OBS MODERATE 35: CPT | Performed by: INTERNAL MEDICINE

## 2024-02-13 ENCOUNTER — LAB (OUTPATIENT)
Dept: URBAN - METROPOLITAN AREA MEDICAL CENTER 10 | Facility: MEDICAL CENTER | Age: 39
End: 2024-02-13
Payer: COMMERCIAL

## 2024-02-13 DIAGNOSIS — T18.108A FOREIGN BODY IN ESOPHAGUS, INITIAL ENCOUNTER: ICD-10-CM

## 2024-02-13 PROCEDURE — 43247 EGD REMOVE FOREIGN BODY: CPT | Performed by: INTERNAL MEDICINE

## 2024-02-13 PROCEDURE — 43235 EGD DIAGNOSTIC BRUSH WASH: CPT | Performed by: INTERNAL MEDICINE

## 2025-02-19 ENCOUNTER — CLAIMS CREATED FROM THE CLAIM WINDOW (OUTPATIENT)
Dept: URBAN - METROPOLITAN AREA MEDICAL CENTER 28 | Facility: MEDICAL CENTER | Age: 40
End: 2025-02-19

## 2025-02-19 PROCEDURE — 99254 IP/OBS CNSLTJ NEW/EST MOD 60: CPT

## 2025-02-20 ENCOUNTER — CLAIMS CREATED FROM THE CLAIM WINDOW (OUTPATIENT)
Dept: URBAN - METROPOLITAN AREA MEDICAL CENTER 28 | Facility: MEDICAL CENTER | Age: 40
End: 2025-02-20
Payer: COMMERCIAL

## 2025-02-20 DIAGNOSIS — R11.2 ACUTE NAUSEA WITH NONBILIOUS VOMITING: ICD-10-CM

## 2025-02-20 DIAGNOSIS — R10.84 ABDOMINAL CRAMPING, GENERALIZED: ICD-10-CM

## 2025-02-20 PROCEDURE — 99232 SBSQ HOSP IP/OBS MODERATE 35: CPT

## 2025-02-21 ENCOUNTER — CLAIMS CREATED FROM THE CLAIM WINDOW (OUTPATIENT)
Dept: URBAN - METROPOLITAN AREA MEDICAL CENTER 28 | Facility: MEDICAL CENTER | Age: 40
End: 2025-02-21
Payer: COMMERCIAL

## 2025-02-21 DIAGNOSIS — R10.84 ABDOMINAL CRAMPING, GENERALIZED: ICD-10-CM

## 2025-02-21 DIAGNOSIS — K29.60 ADENOPAPILLOMATOSIS GASTRICA: ICD-10-CM

## 2025-02-21 DIAGNOSIS — R11.2 ACUTE NAUSEA WITH NONBILIOUS VOMITING: ICD-10-CM

## 2025-02-21 DIAGNOSIS — K22.89 COLUMNAR EPITHELIAL-LINED LOWER ESOPHAGUS: ICD-10-CM

## 2025-02-21 PROCEDURE — 43235 EGD DIAGNOSTIC BRUSH WASH: CPT | Performed by: INTERNAL MEDICINE

## 2025-02-21 PROCEDURE — 43239 EGD BIOPSY SINGLE/MULTIPLE: CPT | Performed by: INTERNAL MEDICINE

## 2025-04-16 ENCOUNTER — CLAIMS CREATED FROM THE CLAIM WINDOW (OUTPATIENT)
Dept: URBAN - METROPOLITAN AREA MEDICAL CENTER 28 | Facility: MEDICAL CENTER | Age: 40
End: 2025-04-16

## 2025-04-16 PROCEDURE — 99255 IP/OBS CONSLTJ NEW/EST HI 80: CPT | Performed by: PHYSICIAN ASSISTANT

## 2025-07-18 ENCOUNTER — CLAIMS CREATED FROM THE CLAIM WINDOW (OUTPATIENT)
Dept: URBAN - METROPOLITAN AREA MEDICAL CENTER 8 | Facility: MEDICAL CENTER | Age: 40
End: 2025-07-18
Payer: COMMERCIAL

## 2025-07-18 DIAGNOSIS — R10.84 ABDOMINAL PAIN, GENERALIZED: ICD-10-CM

## 2025-07-18 DIAGNOSIS — R11.2 NAUSEA AND VOMITING: ICD-10-CM

## 2025-07-18 PROCEDURE — 99222 1ST HOSP IP/OBS MODERATE 55: CPT | Performed by: PHYSICIAN ASSISTANT

## 2025-07-18 PROCEDURE — 99254 IP/OBS CNSLTJ NEW/EST MOD 60: CPT | Performed by: PHYSICIAN ASSISTANT

## 2025-07-18 PROCEDURE — G8427 DOCREV CUR MEDS BY ELIG CLIN: HCPCS | Performed by: PHYSICIAN ASSISTANT

## 2025-07-19 ENCOUNTER — CLAIMS CREATED FROM THE CLAIM WINDOW (OUTPATIENT)
Dept: URBAN - METROPOLITAN AREA MEDICAL CENTER 8 | Facility: MEDICAL CENTER | Age: 40
End: 2025-07-19
Payer: COMMERCIAL

## 2025-07-19 DIAGNOSIS — R10.84 ABDOMINAL PAIN, GENERALIZED: ICD-10-CM

## 2025-07-19 DIAGNOSIS — R11.2 NAUSEA AND VOMITING: ICD-10-CM

## 2025-07-19 PROCEDURE — 99232 SBSQ HOSP IP/OBS MODERATE 35: CPT | Performed by: INTERNAL MEDICINE

## 2025-07-20 ENCOUNTER — CLAIMS CREATED FROM THE CLAIM WINDOW (OUTPATIENT)
Dept: URBAN - METROPOLITAN AREA MEDICAL CENTER 8 | Facility: MEDICAL CENTER | Age: 40
End: 2025-07-20
Payer: COMMERCIAL

## 2025-07-20 DIAGNOSIS — R10.84 ABDOMINAL PAIN, GENERALIZED: ICD-10-CM

## 2025-07-20 DIAGNOSIS — R11.2 NAUSEA AND VOMITING: ICD-10-CM

## 2025-07-20 PROCEDURE — 99232 SBSQ HOSP IP/OBS MODERATE 35: CPT | Performed by: INTERNAL MEDICINE

## 2025-07-21 ENCOUNTER — CLAIMS CREATED FROM THE CLAIM WINDOW (OUTPATIENT)
Dept: URBAN - METROPOLITAN AREA MEDICAL CENTER 8 | Facility: MEDICAL CENTER | Age: 40
End: 2025-07-21
Payer: COMMERCIAL

## 2025-07-21 DIAGNOSIS — R10.84 ABDOMINAL PAIN, GENERALIZED: ICD-10-CM

## 2025-07-21 DIAGNOSIS — K29.50 CHRONIC GASTRITIS: ICD-10-CM

## 2025-07-21 DIAGNOSIS — R11.2 NAUSEA AND VOMITING: ICD-10-CM

## 2025-07-21 PROCEDURE — 43239 EGD BIOPSY SINGLE/MULTIPLE: CPT | Performed by: INTERNAL MEDICINE

## 2025-07-21 PROCEDURE — 43235 EGD DIAGNOSTIC BRUSH WASH: CPT | Performed by: INTERNAL MEDICINE

## 2025-07-22 ENCOUNTER — CLAIMS CREATED FROM THE CLAIM WINDOW (OUTPATIENT)
Dept: URBAN - METROPOLITAN AREA MEDICAL CENTER 8 | Facility: MEDICAL CENTER | Age: 40
End: 2025-07-22
Payer: COMMERCIAL

## 2025-07-22 DIAGNOSIS — E46 CHRONIC MALNUTRITION: ICD-10-CM

## 2025-07-22 DIAGNOSIS — R11.2 NAUSEA AND VOMITING: ICD-10-CM

## 2025-07-22 PROCEDURE — 99232 SBSQ HOSP IP/OBS MODERATE 35: CPT | Performed by: PHYSICIAN ASSISTANT
